# Patient Record
Sex: MALE | Race: WHITE | NOT HISPANIC OR LATINO | URBAN - METROPOLITAN AREA
[De-identification: names, ages, dates, MRNs, and addresses within clinical notes are randomized per-mention and may not be internally consistent; named-entity substitution may affect disease eponyms.]

---

## 2020-02-12 ENCOUNTER — OFFICE VISIT (OUTPATIENT)
Dept: URBAN - METROPOLITAN AREA CLINIC 48 | Facility: CLINIC | Age: 40
End: 2020-02-12

## 2020-02-12 DIAGNOSIS — H43.12 VITREOUS HEMORRHAGE, LEFT EYE: ICD-10-CM

## 2020-02-12 DIAGNOSIS — E10.3522 TYPE 1 DIABETES MELLITUS WITH PROLIFERATIVE DIABETIC RETINOPATHY WITH TRACTION RETINAL DETACHMENT INVOLVING THE MACULA, LEFT EYE: Primary | ICD-10-CM

## 2020-02-12 PROCEDURE — 99204 OFFICE O/P NEW MOD 45 MIN: CPT | Performed by: OPHTHALMOLOGY

## 2020-02-12 RX ORDER — OFLOXACIN 3 MG/ML
0.3 % SOLUTION/ DROPS OPHTHALMIC
Qty: 1 | Refills: 0 | Status: INACTIVE
Start: 2020-02-12 | End: 2020-06-10

## 2020-02-12 RX ORDER — PREDNISOLONE ACETATE 10 MG/ML
1 % SUSPENSION/ DROPS OPHTHALMIC
Qty: 1 | Refills: 3 | Status: INACTIVE
Start: 2020-02-12 | End: 2020-03-04

## 2020-02-12 ASSESSMENT — INTRAOCULAR PRESSURE
OS: 19
OD: 21

## 2020-02-12 NOTE — IMPRESSION/PLAN
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 OS. Vitreous heme OS Plan: OCT ordered and performed today. Discussed diagnosis with patient. The clinical exam is consistent retinal detachment. To reduce the risk of further vision loss, urgent surgical intervention is strongly recommended. Discussed treatment options, the various techniques to repair an TRD discussed with patient including scleral buckle, silicone oil and pneumatic retinopexy. In addition altitude with gas bubble were discussed. Recommend sx, after a through discussion of surgical R/B/A. The patient understands the potential risks of sx, including (but not limited to) bleeding, pain, infection, loss of vision, loss of eye and possible need for more sx. The patient also understands that pre detachment visual acuity may not return.  The patient elects to proceed with sx RL-2

## 2020-02-12 NOTE — IMPRESSION/PLAN
Impression: Type 1 diabetes mellitus with proliferative diabetic retinopathy with traction retinal detachment involving the macula, right eye: e10.3521. OD. Fibrosis, old vitreous heme Plan: OCT ordered and performed today. Discussed diagnosis with patient. The clinical exam is consistent retinal detachment. To reduce the risk of further vision loss, urgent surgical intervention is strongly recommended. Discussed treatment options, the various techniques to repair an RD discussed with patient including scleral buckle, silicone oil and pneumatic retinopexy. In addition altitude with gas bubble were discussed. Recommend sx, after a through discussion of surgical R/B/A. The patient understands the potential risks of sx, including (but not limited to) bleeding, pain, infection, loss of vision, loss of eye and possible need for more sx. The patient also understands that pre detachment visual acuity may not return.  The patient elects to proceed with sx RL-2

## 2020-02-26 ENCOUNTER — SURGERY (OUTPATIENT)
Dept: URBAN - METROPOLITAN AREA SURGERY 26 | Facility: SURGERY | Age: 40
End: 2020-02-26

## 2020-02-26 PROCEDURE — 67113 REPAIR RETINAL DETACH CPLX: CPT | Performed by: OPHTHALMOLOGY

## 2020-02-26 RX ORDER — HYDROCODONE BITARTRATE AND IBUPROFEN 7.5; 2 MG/1; MG/1
TABLET, FILM COATED ORAL
Qty: 6 | Refills: 0 | Status: INACTIVE
Start: 2020-02-26 | End: 2021-07-19

## 2020-02-27 ENCOUNTER — POST-OPERATIVE VISIT (OUTPATIENT)
Dept: URBAN - METROPOLITAN AREA CLINIC 48 | Facility: CLINIC | Age: 40
End: 2020-02-27

## 2020-02-27 PROCEDURE — 99024 POSTOP FOLLOW-UP VISIT: CPT | Performed by: OPHTHALMOLOGY

## 2020-02-27 ASSESSMENT — INTRAOCULAR PRESSURE
OS: 14
OD: 24
OD: 21

## 2020-03-04 ENCOUNTER — POST-OPERATIVE VISIT (OUTPATIENT)
Dept: URBAN - METROPOLITAN AREA CLINIC 48 | Facility: CLINIC | Age: 40
End: 2020-03-04

## 2020-03-04 PROCEDURE — 99024 POSTOP FOLLOW-UP VISIT: CPT | Performed by: OPHTHALMOLOGY

## 2020-03-04 RX ORDER — PREDNISOLONE ACETATE 10 MG/ML
1 % SUSPENSION/ DROPS OPHTHALMIC
Qty: 1 | Refills: 2 | Status: INACTIVE
Start: 2020-03-04 | End: 2020-06-10

## 2020-03-04 ASSESSMENT — INTRAOCULAR PRESSURE
OS: 15
OD: 17

## 2020-04-08 ENCOUNTER — POST-OPERATIVE VISIT (OUTPATIENT)
Dept: URBAN - METROPOLITAN AREA CLINIC 48 | Facility: CLINIC | Age: 40
End: 2020-04-08

## 2020-04-08 DIAGNOSIS — Z09 ENCNTR FOR F/U EXAM AFT TRTMT FOR COND OTH THAN MALIG NEOPLM: Primary | ICD-10-CM

## 2020-04-08 PROCEDURE — 99024 POSTOP FOLLOW-UP VISIT: CPT | Performed by: OPHTHALMOLOGY

## 2020-04-08 ASSESSMENT — INTRAOCULAR PRESSURE
OS: 16
OD: 18

## 2020-06-10 ENCOUNTER — OFFICE VISIT (OUTPATIENT)
Dept: URBAN - METROPOLITAN AREA CLINIC 48 | Facility: CLINIC | Age: 40
End: 2020-06-10

## 2020-06-10 DIAGNOSIS — E10.3512 TYPE 1 DIABETES MELLITUS W/ PROLIFERATIVE DIABETIC RETINOPATHY W/ MACULAR EDEMA, LEFT EYE: ICD-10-CM

## 2020-06-10 DIAGNOSIS — E10.3521 TYPE 1 DIABETES MELLITUS WITH PROLIFERATIVE DIABETIC RETINOPATHY WITH TRACTION RETINAL DETACHMENT INVOLVING THE MACULA, RIGHT EYE: Primary | ICD-10-CM

## 2020-06-10 PROCEDURE — 92134 CPTRZ OPH DX IMG PST SGM RTA: CPT | Performed by: OPHTHALMOLOGY

## 2020-06-10 PROCEDURE — 99213 OFFICE O/P EST LOW 20 MIN: CPT | Performed by: OPHTHALMOLOGY

## 2020-06-10 ASSESSMENT — INTRAOCULAR PRESSURE
OS: 15
OD: 15

## 2020-06-10 NOTE — IMPRESSION/PLAN
Impression: Type 1 diabetes mellitus with proliferative diabetic retinopathy with traction retinal detachment involving the macula, right eye: e10.3521. OD. Fibrosis, old vitreous heme Plan: OCT ordered and performed today. Discussed diagnosis with patient. The clinical exam is consistent with traction retinal detachment. To reduce the risk of further vision loss, urgent surgical intervention is strongly recommended. Discussed treatment options, the various techniques to repair an RD discussed with patient including scleral buckle, silicone oil and pneumatic retinopexy. In addition altitude with gas bubble were discussed. Recommend sx, after a through discussion of surgical R/B/A. The patient understands the potential risks of sx, including (but not limited to) bleeding, pain, infection, loss of vision, loss of eye and possible need for more sx. The patient also understands that pre detachment visual acuity may not return. Patient elects to defer at this time, patient would like to try holistic approach at this time. Will observe closely.

## 2020-06-10 NOTE — IMPRESSION/PLAN
Impression: Type 1 diabetes mellitus w/ proliferative diabetic retinopathy w/ macular edema, left eye: J45.4566. OS.
TRD repair OS 2/26/2020 Plan: OCT ordered and performed today. The clinical exam is consistent with proliferative diabetic retinopathy. Discussed diagnosis with patient. Recommend close observation at this time. Discussed risk of progression with the present condition. The patient was advised to maintain tight blood sugar control, blood pressure and lipid control. Patient agrees with plan. Recommend a VF 24-2 for disability paperwork.

## 2020-10-14 ENCOUNTER — OFFICE VISIT (OUTPATIENT)
Dept: URBAN - METROPOLITAN AREA CLINIC 48 | Facility: CLINIC | Age: 40
End: 2020-10-14
Payer: COMMERCIAL

## 2020-10-14 DIAGNOSIS — E10.3513 TYPE 1 DIABETES MELLITUS WITH PROLIFERATIVE DIABETIC RETINOPATHY WITH MACULAR EDEMA, BILATERAL: Primary | ICD-10-CM

## 2020-10-14 PROCEDURE — 99213 OFFICE O/P EST LOW 20 MIN: CPT | Performed by: OPHTHALMOLOGY

## 2020-10-14 PROCEDURE — 92134 CPTRZ OPH DX IMG PST SGM RTA: CPT | Performed by: OPHTHALMOLOGY

## 2020-10-14 ASSESSMENT — INTRAOCULAR PRESSURE
OS: 17
OD: 17

## 2020-10-14 NOTE — IMPRESSION/PLAN
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 Bilateral.
S/p PRP OS
increasing Ischemia OU Plan: OCT's ordered and performed today. Discussed diagnosis with patient, recommend Panretinal photocoagulation in the right eye and or Avastin intravitreal injections to help prevent any further damage to the nerve tissue, Patient would like to observe at this time. The specific risk of PRP and intravitreal injections were discussed with the patient and he understands the plan. VF today was performed, patient before and is currently legally blind in both eyes. Patient advised to have  call Ghada Ratliff OM if she needs further documentation of legally blindness for patient.

## 2021-04-06 ENCOUNTER — OFFICE VISIT (OUTPATIENT)
Dept: URBAN - METROPOLITAN AREA CLINIC 48 | Facility: CLINIC | Age: 41
End: 2021-04-06
Payer: COMMERCIAL

## 2021-04-06 DIAGNOSIS — H43.13 VITREOUS HEMORRHAGE, BILATERAL: ICD-10-CM

## 2021-04-06 PROCEDURE — 92134 CPTRZ OPH DX IMG PST SGM RTA: CPT | Performed by: OPHTHALMOLOGY

## 2021-04-06 PROCEDURE — 76512 OPH US DX B-SCAN: CPT | Performed by: OPHTHALMOLOGY

## 2021-04-06 PROCEDURE — 92014 COMPRE OPH EXAM EST PT 1/>: CPT | Performed by: OPHTHALMOLOGY

## 2021-04-06 ASSESSMENT — INTRAOCULAR PRESSURE
OS: 18
OD: 18

## 2021-04-06 NOTE — IMPRESSION/PLAN
Impression: Vitreous hemorrhage, bilateral: H43.13. Plan: Continue to monitor at this time. B-scan confirmed Vitreous Hemorrhage. 
Patient coming back next week for [facility] w/ Dr. Gearold Kanner

## 2021-04-06 NOTE — IMPRESSION/PLAN
Impression: Type 1 diabetes mellitus w/ proliferative diabetic retinopathy w/ macular edema, bilateral: e10.3513. Plan: Recommend RC w/ Dr. Carmelita Cazares. Advised patient to sleep in 40 degree angle. Went over R/B/A precautions. Advised patient to come NPO for RC

RTC Next Wednesday or next available for [facility] w/ Dr. Carmelita Cazares.

## 2021-04-14 ENCOUNTER — OFFICE VISIT (OUTPATIENT)
Dept: URBAN - METROPOLITAN AREA CLINIC 48 | Facility: CLINIC | Age: 41
End: 2021-04-14
Payer: COMMERCIAL

## 2021-04-14 PROCEDURE — 92134 CPTRZ OPH DX IMG PST SGM RTA: CPT | Performed by: OPHTHALMOLOGY

## 2021-04-14 PROCEDURE — 99213 OFFICE O/P EST LOW 20 MIN: CPT | Performed by: OPHTHALMOLOGY

## 2021-04-14 ASSESSMENT — INTRAOCULAR PRESSURE
OD: 20
OS: 15

## 2021-04-14 NOTE — IMPRESSION/PLAN
Impression: Type 1 diabetes mellitus w/ proliferative diabetic retinopathy w/ macular edema, bilateral: e10.3513. Bilateral.
VH OU Patient changed his diet to gluten free Plan: OCT ordered and performed today. Discussed diagnosis with patient. The clinical exam was consistent with Type 1 diabetes. The patient was advised to maintain tight blood sugar control, blood pressure and lipid control. Discussed with patient I recommend a Avastin injection in both eyes today. Discussed with patient possible surgery in the right eye in the future. Patient elects to defer treatment at this time, recommend follow up next month.

## 2021-06-04 ENCOUNTER — OFFICE VISIT (OUTPATIENT)
Dept: URBAN - METROPOLITAN AREA CLINIC 48 | Facility: CLINIC | Age: 41
End: 2021-06-04
Payer: COMMERCIAL

## 2021-06-04 PROCEDURE — 92014 COMPRE OPH EXAM EST PT 1/>: CPT | Performed by: STUDENT IN AN ORGANIZED HEALTH CARE EDUCATION/TRAINING PROGRAM

## 2021-06-04 ASSESSMENT — INTRAOCULAR PRESSURE
OD: 17
OS: 14

## 2021-06-04 NOTE — IMPRESSION/PLAN
Impression: Type 1 diabetes mellitus w/ proliferative diabetic retinopathy w/ macular edema, bilateral: L37.6427 Bilateral. Plan: OD new vit. heme within scar tissue seen on today's exam, will refer to Dr. Sourav Smith for further evaluation. RF/RT precautions reviewed. Patient aware to call clinic if new symptoms/concerns develop prior to next appt.

## 2021-07-19 ENCOUNTER — OFFICE VISIT (OUTPATIENT)
Dept: URBAN - METROPOLITAN AREA CLINIC 47 | Facility: CLINIC | Age: 41
End: 2021-07-19
Payer: COMMERCIAL

## 2021-07-19 DIAGNOSIS — H43.11 VITREOUS HEMORRHAGE, RIGHT EYE: ICD-10-CM

## 2021-07-19 DIAGNOSIS — H25.13 AGE-RELATED NUCLEAR CATARACT, BILATERAL: ICD-10-CM

## 2021-07-19 PROCEDURE — 92134 CPTRZ OPH DX IMG PST SGM RTA: CPT | Performed by: OPHTHALMOLOGY

## 2021-07-19 PROCEDURE — 92004 COMPRE OPH EXAM NEW PT 1/>: CPT | Performed by: OPHTHALMOLOGY

## 2021-07-19 ASSESSMENT — INTRAOCULAR PRESSURE
OS: 16
OD: 15

## 2021-07-19 NOTE — IMPRESSION/PLAN
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 Plan: He is here for 2nd opinion. Your notes were reviewed. There is active neovascularization associated with proliferative diabetic retinopathy (PDR), and the patient would benefit from panretinal photocoagulation (PRP) treatment. We discussed the natural history of PDR and the role of treatment with PRP. Patient understands that the vision does not generally improve after laser treatment. The goal is to reduce the risk of further visual loss. The risks of laser include but are not limited to loss of vision, hemorrhage, pain, retinal detachment, and the need for additional laser or surgery. I Recommend PPV/MP/EL. However, he does not want to pursue surgery and he understands the risk of TRD, NVG, and blindness without treatment. He wants to try supplements and we discussed there is no level one evidence to support this. observe for 1-2 months and if no improvement, consider PPV vs PRP.  
RTC 1-2 months OCT OU re-eval

## 2024-07-22 NOTE — IMPRESSION/PLAN
Impression: Vitreous hemorrhage, left eye: H43.12. OS. Plan: OCT ordered and performed today. Discussed diagnosis with patient. The clinical exam is consistent with Non clearing Vitreous Hemorrhage. Discussed treatment options with patient. Observation vs Vitrectomy. Recommend sx to clear Vitreous Hemorrhage. After a through discussion of surgical R/B/A, the patient elects to proceed with sx. The patient understands the potential risks of sx, including (but not limited to) bleeding, pain, infection, loss of vision, loss of eye and possible need for more sx. RL-2. Pt called to follow up on medication refill. Please advise.